# Patient Record
Sex: MALE | Race: OTHER | NOT HISPANIC OR LATINO | URBAN - METROPOLITAN AREA
[De-identification: names, ages, dates, MRNs, and addresses within clinical notes are randomized per-mention and may not be internally consistent; named-entity substitution may affect disease eponyms.]

---

## 2022-01-28 ENCOUNTER — EMERGENCY (EMERGENCY)
Facility: HOSPITAL | Age: 22
LOS: 1 days | Discharge: HOME | End: 2022-01-28
Attending: EMERGENCY MEDICINE | Admitting: EMERGENCY MEDICINE
Payer: COMMERCIAL

## 2022-01-28 VITALS
DIASTOLIC BLOOD PRESSURE: 60 MMHG | RESPIRATION RATE: 15 BRPM | OXYGEN SATURATION: 99 % | TEMPERATURE: 98 F | SYSTOLIC BLOOD PRESSURE: 111 MMHG | HEART RATE: 71 BPM

## 2022-01-28 VITALS
RESPIRATION RATE: 16 BRPM | OXYGEN SATURATION: 100 % | HEART RATE: 80 BPM | SYSTOLIC BLOOD PRESSURE: 127 MMHG | TEMPERATURE: 98 F | DIASTOLIC BLOOD PRESSURE: 82 MMHG

## 2022-01-28 LAB
ALBUMIN SERPL ELPH-MCNC: 4.4 G/DL — SIGNIFICANT CHANGE UP (ref 3.3–5)
ALP SERPL-CCNC: 73 U/L — SIGNIFICANT CHANGE UP (ref 40–120)
ALT FLD-CCNC: 52 U/L — HIGH (ref 4–41)
ANION GAP SERPL CALC-SCNC: 12 MMOL/L — SIGNIFICANT CHANGE UP (ref 7–14)
AST SERPL-CCNC: 32 U/L — SIGNIFICANT CHANGE UP (ref 4–40)
BASOPHILS # BLD AUTO: 0.05 K/UL — SIGNIFICANT CHANGE UP (ref 0–0.2)
BASOPHILS NFR BLD AUTO: 1 % — SIGNIFICANT CHANGE UP (ref 0–2)
BILIRUB SERPL-MCNC: 0.6 MG/DL — SIGNIFICANT CHANGE UP (ref 0.2–1.2)
BUN SERPL-MCNC: 11 MG/DL — SIGNIFICANT CHANGE UP (ref 7–23)
CALCIUM SERPL-MCNC: 9.8 MG/DL — SIGNIFICANT CHANGE UP (ref 8.4–10.5)
CHLORIDE SERPL-SCNC: 104 MMOL/L — SIGNIFICANT CHANGE UP (ref 98–107)
CO2 SERPL-SCNC: 23 MMOL/L — SIGNIFICANT CHANGE UP (ref 22–31)
CREAT SERPL-MCNC: 0.97 MG/DL — SIGNIFICANT CHANGE UP (ref 0.5–1.3)
EOSINOPHIL # BLD AUTO: 0.08 K/UL — SIGNIFICANT CHANGE UP (ref 0–0.5)
EOSINOPHIL NFR BLD AUTO: 1.5 % — SIGNIFICANT CHANGE UP (ref 0–6)
GLUCOSE SERPL-MCNC: 110 MG/DL — HIGH (ref 70–99)
HCT VFR BLD CALC: 48.4 % — SIGNIFICANT CHANGE UP (ref 39–50)
HGB BLD-MCNC: 15.6 G/DL — SIGNIFICANT CHANGE UP (ref 13–17)
IANC: 2.11 K/UL — SIGNIFICANT CHANGE UP (ref 1.5–8.5)
IMM GRANULOCYTES NFR BLD AUTO: 0.2 % — SIGNIFICANT CHANGE UP (ref 0–1.5)
LYMPHOCYTES # BLD AUTO: 2.47 K/UL — SIGNIFICANT CHANGE UP (ref 1–3.3)
LYMPHOCYTES # BLD AUTO: 47.8 % — HIGH (ref 13–44)
MCHC RBC-ENTMCNC: 27.3 PG — SIGNIFICANT CHANGE UP (ref 27–34)
MCHC RBC-ENTMCNC: 32.2 GM/DL — SIGNIFICANT CHANGE UP (ref 32–36)
MCV RBC AUTO: 84.6 FL — SIGNIFICANT CHANGE UP (ref 80–100)
MONOCYTES # BLD AUTO: 0.45 K/UL — SIGNIFICANT CHANGE UP (ref 0–0.9)
MONOCYTES NFR BLD AUTO: 8.7 % — SIGNIFICANT CHANGE UP (ref 2–14)
NEUTROPHILS # BLD AUTO: 2.11 K/UL — SIGNIFICANT CHANGE UP (ref 1.8–7.4)
NEUTROPHILS NFR BLD AUTO: 40.8 % — LOW (ref 43–77)
NRBC # BLD: 0 /100 WBCS — SIGNIFICANT CHANGE UP
NRBC # FLD: 0 K/UL — SIGNIFICANT CHANGE UP
PLATELET # BLD AUTO: 279 K/UL — SIGNIFICANT CHANGE UP (ref 150–400)
POTASSIUM SERPL-MCNC: 4.1 MMOL/L — SIGNIFICANT CHANGE UP (ref 3.5–5.3)
POTASSIUM SERPL-SCNC: 4.1 MMOL/L — SIGNIFICANT CHANGE UP (ref 3.5–5.3)
PROT SERPL-MCNC: 7.8 G/DL — SIGNIFICANT CHANGE UP (ref 6–8.3)
RBC # BLD: 5.72 M/UL — SIGNIFICANT CHANGE UP (ref 4.2–5.8)
RBC # FLD: 13.6 % — SIGNIFICANT CHANGE UP (ref 10.3–14.5)
SODIUM SERPL-SCNC: 139 MMOL/L — SIGNIFICANT CHANGE UP (ref 135–145)
WBC # BLD: 5.17 K/UL — SIGNIFICANT CHANGE UP (ref 3.8–10.5)
WBC # FLD AUTO: 5.17 K/UL — SIGNIFICANT CHANGE UP (ref 3.8–10.5)

## 2022-01-28 PROCEDURE — 70491 CT SOFT TISSUE NECK W/DYE: CPT | Mod: 26,MG

## 2022-01-28 PROCEDURE — 71260 CT THORAX DX C+: CPT | Mod: 26,MG

## 2022-01-28 PROCEDURE — 99285 EMERGENCY DEPT VISIT HI MDM: CPT

## 2022-01-28 PROCEDURE — G1004: CPT

## 2022-01-28 NOTE — ED ADULT NURSE NOTE - OBJECTIVE STATEMENT
Pt received to room 3. Pt is A&Ox4, ambulatory at baseline. Pt is c/o swollen mass on left side of neck that has been getting worse for a couple of weeks. Pt denies pain currently but states that it was painful at first. States that it was more swollen than it is currently but the swelling has decreased. Denies pain on palpation. Denies fever, chills, sore throat, CP, SOB. Denies any sick contact recently. States he went to his primary care and got blood work taken and a sonogram of mass, and antibiotics, has been taking antibiotics since monday. Pt states he has pmhx of insulin resistance and asthma. No swelling or tenderness noted on palpation of lymph node chain on right side of neck. Pt denies abdominal pain, n/v/d. VSS and noted. IV access established with 20G to the R AC, labs sent as per MD orders. MD at bedside. Pt received to room 3. Pt is A&Ox4, ambulatory at baseline. Pt is c/o swollen mass on left side of neck that has been getting worse for a couple of weeks. Pt denies pain currently but states that it was painful at first. States that it was more swollen than it is currently but the swelling has decreased. Denies pain on palpation. Denies fever, chills, sore throat, CP, SOB. Denies any sick contact recently. States he went to his primary care and got blood work taken and a sonogram of mass, and antibiotics, has been taking antibiotics since monday. Pt states he has pmhx of insulin resistance and asthma. No swelling or tenderness noted on palpation of lymph node chain on right side of neck.  Airway patent, RR even and unlabored. Pt denies abdominal pain, n/v/d. VSS and noted. IV access established with 20G to the R AC, labs sent as per MD orders. MD at bedside.

## 2022-01-28 NOTE — ED PROVIDER NOTE - OBJECTIVE STATEMENT
20yo male with pmh asthma, "insulin resistance" on metformin, presenting with neck mass.  2 weeks ago patient noticed swelling left side of neck which was initially painful but now has decreased in size and is not painful.  Had ultrasound done 4 days ago showing a "borderline suspicious" lymph node of left side of neck.  He has been asymptomatic otherwise, no sick contacts, fevers, or recent illnesses.

## 2022-01-28 NOTE — ED ADULT NURSE REASSESSMENT NOTE - NS ED NURSE REASSESS COMMENT FT1
Pt remains at baseline mental status. Pt denies any pain/discomfort. RR even and unlabored, denies CP, SOB. Awaiting CT results.

## 2022-01-28 NOTE — ED PROVIDER NOTE - NSFOLLOWUPINSTRUCTIONS_ED_ALL_ED_FT
Rest, drink plenty of fluids  Advance activity as tolerated  Continue all previously prescribed medications as directed  Follow up with your PMD - bring copies of your results  Return to the ER for chest pain, difficulty breathing, or other new or concerning symptoms

## 2022-01-28 NOTE — ED PROVIDER NOTE - PHYSICAL EXAMINATION
General appearance: NAD, conversant, afebrile    Eyes: anicteric sclerae, JULIEN, EOMI   HENT: Atraumatic; oropharynx clear, MMM and no ulcerations, no pharyngeal erythema or exudate, mobile nontender nodule L neck   Neck: Trachea midline; Full range of motion, supple   Pulm: CTA bl, normal respiratory effort and no intercostal retractions, normal work of breathing   CV: RRR, No murmurs, rubs, or gallops   Extremities: No peripheral edema    Skin: Dry, normal temperature, turgor and texture; no rash   Psych: Appropriate affect, cooperative; alert and oriented to person, place and time

## 2022-01-28 NOTE — ED PROVIDER NOTE - ATTENDING CONTRIBUTION TO CARE
Dr. Aguilar: 22 yo male with PMH "insulin resistance" on metformin, in ED with 2 weeks of swelling to left lateral cervical neck.  Initially swelling was painful and larger than current size.  Has since decreased in size and is no longer painful, but still present.  Saw PMD for this and was ordered for ultrasound.  Ultrasound performed 4 days ago showed, "borderline suspicious" left neck level 2 lymph node and other lymph node on right neck.  Was recommended to have CT but had insurance issues and so came to ED.  No fever, weight loss, dysphagia, difficulty eating or other complaints.  Had sore throat yesterday but not today.  On exam pt well appearing, in NAD, heart RRR, lungs CTAB, abd NTND, extremities without swelling, strength 5/5 in all extremities and skin without rash.  Throat clear, without exudates, with midline uvula and tongue, no elevation.  Left neck laterally with ~2x1 cm mobile nontender palpable mass consistent with a lymph node.  No other palpable lymph nodes bilaterally on neck, supraclavicular or axillary regions.  Of note, pt currently on day 3 of unknown abx for this, without change.

## 2022-01-28 NOTE — ED PROVIDER NOTE - PROGRESS NOTE DETAILS
Keith PGY3: Patient reassessed, feeling well.  Reviewed results with patient and father at bedside.  Explained to patient plan for follow up with pmd outpatient and agrees with plan.  Jairo guerra Dr. Aguilar: I discussed results with pt and gave copies of labs and imaging results to pt and father; explained need to follow up with PMD ASAP and pt states that he will follow up with PMD within 1 week

## 2022-01-28 NOTE — ED PROVIDER NOTE - PATIENT PORTAL LINK FT
You can access the FollowMyHealth Patient Portal offered by [facility] by registering at the following website: [email or web address]. By joining [employer]’s FollowMyHealth portal, you will also be able to view your health information using other applications (apps) compatible with our system.

## 2022-01-28 NOTE — ED PROVIDER NOTE - CLINICAL SUMMARY MEDICAL DECISION MAKING FREE TEXT BOX
20yo male presenting with left sided neck nodule.  Likely lymphadenopathy given acute nature, location, decreasing size and pain.  Given recent ultrasound, will get ct to better characterize and check labs.  Patient well appearing, nontoxic in no resp distress and no signs airway involvement/ compromise.  Will reassess after results.

## 2022-01-28 NOTE — ED ADULT TRIAGE NOTE - CHIEF COMPLAINT QUOTE
alert oriented  c/o non painful left neck mass x 2 weeks  no difficulty breathing  hx asthma  insulin resistance

## 2022-02-04 PROBLEM — Z00.00 ENCOUNTER FOR PREVENTIVE HEALTH EXAMINATION: Status: ACTIVE | Noted: 2022-02-04
